# Patient Record
Sex: MALE | Race: WHITE | NOT HISPANIC OR LATINO | Employment: UNEMPLOYED | URBAN - METROPOLITAN AREA
[De-identification: names, ages, dates, MRNs, and addresses within clinical notes are randomized per-mention and may not be internally consistent; named-entity substitution may affect disease eponyms.]

---

## 2019-07-30 ENCOUNTER — TELEPHONE (OUTPATIENT)
Dept: PSYCHIATRY | Facility: CLINIC | Age: 24
End: 2019-07-30

## 2019-07-30 NOTE — TELEPHONE ENCOUNTER
----- Message from Shanique Wharton sent at 7/30/2019  1:31 PM CDT -----  Contact: Pt   Please give pt a call at [phone] (rzvr) to have a new pt appt scheduled.

## 2019-08-16 PROBLEM — F32.0 CURRENT MILD EPISODE OF MAJOR DEPRESSIVE DISORDER WITHOUT PRIOR EPISODE: Status: ACTIVE | Noted: 2019-08-16

## 2019-09-27 ENCOUNTER — INITIAL CONSULT (OUTPATIENT)
Dept: PSYCHIATRY | Facility: CLINIC | Age: 24
End: 2019-09-27
Payer: COMMERCIAL

## 2019-09-27 VITALS
SYSTOLIC BLOOD PRESSURE: 129 MMHG | WEIGHT: 260.13 LBS | HEART RATE: 69 BPM | BODY MASS INDEX: 31.67 KG/M2 | DIASTOLIC BLOOD PRESSURE: 86 MMHG

## 2019-09-27 DIAGNOSIS — F41.1 GENERALIZED ANXIETY DISORDER: ICD-10-CM

## 2019-09-27 DIAGNOSIS — F31.81 BIPOLAR II DISORDER, MILD, DEPRESSED, WITH ANXIOUS DISTRESS: Primary | ICD-10-CM

## 2019-09-27 PROCEDURE — 90792 PR PSYCHIATRIC DIAGNOSTIC EVALUATION W/MEDICAL SERVICES: ICD-10-PCS | Mod: S$GLB,,, | Performed by: PSYCHIATRY & NEUROLOGY

## 2019-09-27 PROCEDURE — 90792 PSYCH DIAG EVAL W/MED SRVCS: CPT | Mod: S$GLB,,, | Performed by: PSYCHIATRY & NEUROLOGY

## 2019-09-27 PROCEDURE — 99999 PR PBB SHADOW E&M-EST. PATIENT-LVL II: CPT | Mod: PBBFAC,,, | Performed by: PSYCHIATRY & NEUROLOGY

## 2019-09-27 PROCEDURE — 99999 PR PBB SHADOW E&M-EST. PATIENT-LVL II: ICD-10-PCS | Mod: PBBFAC,,, | Performed by: PSYCHIATRY & NEUROLOGY

## 2019-09-27 RX ORDER — LAMOTRIGINE 25(21)-50
KIT ORAL
Qty: 1 KIT | Refills: 0 | Status: SHIPPED | OUTPATIENT
Start: 2019-09-27 | End: 2019-10-09

## 2019-09-27 RX ORDER — LAMOTRIGINE 50(42)-100
KIT ORAL
Qty: 1 KIT | Refills: 0 | Status: SHIPPED | OUTPATIENT
Start: 2019-09-27 | End: 2019-10-09

## 2019-09-27 NOTE — PATIENT INSTRUCTIONS
Lamotrigine tablets  What is this medicine?  LAMOTRIGINE (la LOPEZ tri jeen) is used to control seizures in adults and children with epilepsy and Lennox-Gastaut syndrome. It is also used in adults to treat bipolar disorder.  How should I use this medicine?  Take this medicine by mouth with a glass of water. Follow the directions on the prescription label. Do not chew these tablets. If this medicine upsets your stomach, take it with food or milk. Take your doses at regular intervals. Do not take your medicine more often than directed.  A special MedGuide will be given to you by the pharmacist with each new prescription and refill. Be sure to read this information carefully each time.  Talk to your pediatrician regarding the use of this medicine in children. While this drug may be prescribed for children as young as 2 years for selected conditions, precautions do apply.  What side effects may I notice from receiving this medicine?  Side effects you should report to your doctor or health care professional as soon as possible:  · allergic reactions like skin rash, itching or hives, swelling of the face, lips, or tongue  · blurred or double vision  · difficulty walking or controlling muscle movements  · fever  · headache, stiff neck, and sensitivity to light  · painful sores in the mouth, eyes, or nose  · redness, blistering, peeling or loosening of the skin, including inside the mouth  · severe muscle pain  · swollen lymph glands  · uncontrollable eye movements  · unusual bruising or bleeding  · unusually weak or tired  · vomiting  · worsening of mood, thoughts or actions of suicide or dying  · yellowing of the eyes or skin  Side effects that usually do not require medical attention (report to your doctor or health care professional if they continue or are bothersome):  · diarrhea or constipation  · difficulty sleeping  · nausea  · tremors  What may interact with this medicine?  · carbamazepine  · female hormones,  including contraceptive or birth control pills  · methotrexate  · phenobarbital  · phenytoin  · primidone  · pyrimethamine  · rifampin  · trimethoprim  · valproic acid  What if I miss a dose?  If you miss a dose, take it as soon as you can. If it is almost time for your next dose, take only that dose. Do not take double or extra doses.  Where should I keep my medicine?  Keep out of reach of children.  Store at room temperature between 15 and 30 degrees C (59 and 86 degrees F). Throw away any unused medicine after the expiration date.  What should I tell my health care provider before I take this medicine?  They need to know if you have any of these conditions:  · a history of depression or bipolar disorder  · aseptic meningitis during prior use of lamotrigine  · folate deficiency  · kidney disease  · liver disease  · suicidal thoughts, plans, or attempt; a previous suicide attempt by you or a family member  · an unusual or allergic reaction to lamotrigine or other seizure medications, other medicines, foods, dyes, or preservatives  · pregnant or trying to get pregnant  · breast-feeding  What should I watch for while using this medicine?  Visit your doctor or health care professional for regular checks on your progress. If you take this medicine for seizures, wear a Medic Alert bracelet or necklace. Carry an identification card with information about your condition, medicines, and doctor or health care professional.  It is important to take this medicine exactly as directed. When first starting treatment, your dose will need to be adjusted slowly. It may take weeks or months before your dose is stable. You should contact your doctor or health care professional if your seizures get worse or if you have any new types of seizures. Do not stop taking this medicine unless instructed by your doctor or health care professional. Stopping your medicine suddenly can increase your seizures or their severity.  Contact your  doctor or health care professional right away if you develop a rash while taking this medicine. Rashes may be very severe and sometimes require treatment in the hospital. Deaths from rashes have occurred. Serious rashes occur more often in children than adults taking this medicine. It is more common for these serious rashes to occur during the first 2 months of treatment, but a rash can occur at any time.  You may get drowsy, dizzy, or have blurred vision. Do not drive, use machinery, or do anything that needs mental alertness until you know how this medicine affects you. To reduce dizzy or fainting spells, do not sit or stand up quickly, especially if you are an older patient. Alcohol can increase drowsiness and dizziness. Avoid alcoholic drinks.  If you are taking this medicine for bipolar disorder, it is important to report any changes in your mood to your doctor or health care professional. If your condition gets worse, you get mentally depressed, feel very hyperactive or manic, have difficulty sleeping, or have thoughts of hurting yourself or committing suicide, you need to get help from your health care professional right away. If you are a caregiver for someone taking this medicine for bipolar disorder, you should also report these behavioral changes right away. The use of this medicine may increase the chance of suicidal thoughts or actions. Pay special attention to how you are responding while on this medicine.  Your mouth may get dry. Chewing sugarless gum or sucking hard candy, and drinking plenty of water may help. Contact your doctor if the problem does not go away or is severe.  Women who become pregnant while using this medicine may enroll in the [place] Antiepileptic Drug Pregnancy Registry by calling 1-460.175.5635. This registry collects information about the safety of antiepileptic drug use during pregnancy.  NOTE:This sheet is a summary. It may not cover all possible information. If you  have questions about this medicine, talk to your doctor, pharmacist, or health care provider. Copyright© 2017 [employer]        Treating Bipolar Disorder    Bipolar disorder results in extreme mood swings that can greatly disrupt your life. These symptoms may cause you distress. But with treatment, you can lead a more normal life.  Medicines  Bipolar disorder is often treated with medicines that stabilize moods. They help you feel better by keeping your moods more even, and help prevent future mood swings. Sometimes you may also be prescribed medicines that treat depression. All medicines can have side effects. If youre troubled by side effects, tell your healthcare provider. Changing the dose or type of your medicine may help. But dont stop taking medicines until your healthcare provider tells you. If you do, your symptoms will likely come back.  Talk therapy (psychotherapy)  Talking to a therapist or counselor may be part of your treatment. Having bipolar disorder can make it hard to hold a job or go to school. It can create stress for both you and your loved ones. A therapist can teach you how to cope with bipolar disorder. This can help you lessen manic or depressive episodes, or even prevent them. Your therapist can help you work out problems and heal relationships. He or she can also provide support when you need it most.  Friends and family  Those closest to you may also need support. There are many groups for families of people with bipolar disorder. Learning more about this disorder can help your loved ones cope. It can also help them take an active role in your care.  Looking ahead  People with bipolar disorder have periods with no symptoms. But it is a chronic illness that requires lifetime care. Just as with heart conditions or diabetes, bipolar symptoms can return or treatments many need to be changed. Ongoing professional support is key to effective long-term management. Much research is being  done on bipolar disorder. This research may lead to improved treatments and hope for a better future.  Resources  · National [place] of Mental Health  744.789.7166  www.nimh.nih.gov  · National [place] on Mental Illness  258.876.1286  www.[place].org  · [facility] [place]  521.699.4220  www.[facility].org  · National Suicide Prevention Lifeline 593-090-AIID (530-247-4056) www.suicidepreventionlifeline.org   Date Last Reviewed: 5/1/2017  © 8499-0393 [employer]. [address] [place] [address]. All rights reserved. This information is not intended as a substitute for professional medical care. Always follow your healthcare professional's instructions.

## 2019-09-27 NOTE — PROGRESS NOTES
Outpatient Psychiatry Initial Visit (MD/NP)    9/27/2019    John Clements, a 24 y.o. male, presenting for initial evaluation visit. Met with patient     Reason for Encounter: self-referral     Chief Complaint: Medication management for Depression and PTSD        History of Present Illness:   25 yo male who comes here for medication management main symptom of anxiety.  in [facility] for 4 years and then moved here 2 months ago to join [employer]. Mom grew up here. Was moved to the [employer] [place] the year before and it he felt that he did not like the way that it ran.   Endorses depression as the most frequent of his moods.      Symptoms:  Anxiety  He has tension headaches, cloudy in thinking, dreading getting up to face the day, heart racing  Feels over whelmed  Pristiq to some degree has decreased the frequency     Depression:  MDI score of 33 Refer to flow sheet data from that day  He has detached from his feeling. He feels that he has become black and white. Decreased motivation, not wanting to get up.     Insomnia  Sleep: at least two nights a week where he is wired (anxious thoughts) and doesn't need sleep.     Drinking to compensate for racing thoughts: 5 oz of whiskey He has turned to drinking alcohol to calm down the anxiety and to help with depressed mood. Drinks at least twice a week. No symptoms of withdrawal.     Mood Disorder Questionnaire:  + For episodes of increased self confidence, irritability leading to arguments, less sleep needed, racing thoughts, easy distractibility, increased energy, increased activity, increased socialization including calling friends in the middle of the night, increased sexual interest, increased money spending, all happening at the same time, and creating moderate problems in his life           Depression Symptoms:    1)Depressed mood most of the day, nearly every day: yes  2) Markedly diminished interest or pleasure: yes  3) Significant  weight loss when not dieting or weight gain or decrease or increase in appetite nearly every day: varies  4) Insomnia or hypersomnia nearly every day: yes  5) Psychomotor agitation or retardation nearly: yes  6) Fatigue or loss of energy nearly every day: yes  7) Feelings of worthlessness or excessive or inappropriate guilt: yes  8) Diminished ability to think or concentrate, or indecisiveness, nearly every day: yes  9) Recurrent thoughts of death (not just fear of dying), recurrent suicidal ideation without a specific plan, or a suicide attempt or a specific plan for committing suicide: passive    Acute Stress Disorder/PTSD    Traumatic Event:    Seeing someone be killed or seriously injured  Having a loved one die through homicide or suicide    In the past month, have you..  Had nightmares about the event(s) or thought about the event(s) when you did not want to: two nights a month   Tried hard not to think about the event(s) or went out of your way to avoid situations that reminded you of the event(s)? Yes (can't drive [place] [address])  Been constantly on guard, watchful, or easily startled?  (not easily started)  Felt numb or detached from people, activities, or your surroundings?  (numbness)  Felt guilty or unable to stop blaming yourself or others for the events(s) or any problems the event(s) may have caused? (in an indirect way)                  Review Of Systems:     Pertinent items are noted in HPI.    Current Evaluation:         Constitutional  General:   Well groomed, age appropriate     Musculoskeletal  Gait & Station:   non ataxic     Psychiatric  Speech:   clear and coherent, regular rate and rhythm   Mood & Affect:  Mood: currently anxious Affect: anxious   Thought Process:  Linear, logical, goal directed   Thought Content:  No delusions, no hallucinations, no si no hi   Insight:  fair   Judgement: intact   Orientation:  Oriented to time, place, person, and situation   Memory: Intact for both  recent and remote as evidenced by 3/3 object recall   Language: Intact   Attention Span & Concentration:  Intact to conversation. Capable of doing days of the week backwards   Fund of Knowledge:  Adequate for age and education level       Relevant Elements of Neurological Exam: normal gait      Laboratory Data  No visits with results within 1 Month(s) from this visit.   Latest known visit with results is:   No results found for any previous visit.               Past History:       Medications  Outpatient Encounter Medications as of 9/27/2019   Medication Sig Dispense Refill    desvenlafaxine succinate (PRISTIQ) 50 MG Tb24 TK 1 T PO  QD 30 tablet 2    lamoTRIgine (LAMICTAL ODT STARTER, BLUE,) 25 mg (21) -50 mg (7) TRDD Follow the instructions on the packet 1 kit 0    lamoTRIgine (LAMICTAL ODT STARTER, GREEN,) 50 mg (42) -100 mg (14) TRDD To be started after finishing the blue starter pack 1 kit 0     No facility-administered encounter medications on file as of 9/27/2019.        Medication Compliance  Yes    Past Medical/Surgical History:   Past Medical History:   Diagnosis Date    Anxiety      No past surgical history on file.    Neurological History:  Seizures:  no  Head Trauma:  no      Past Psychiatric History:   Previous Psychiatric Hospitalizations: no   Previous SI/HI: no  Previous Suicide Attempts: no    Previous Medication Trials: lexapro, pristiq  Psychiatric Care (current & past): no  History of Psychotherapy: none      SOCIAL HISTORY:  Developmental/Childhood: Grew up with mom , dad and mom  at [age]. Not a bad childhood but mom worked shift work as a . Had a  a lot. Has a little brother from mom's second marriage. Says mom is a good parent but she is not very emotional (her work was always first). Dad was not around a lot. Not very much of a nurturing figure. Mom remarried and was in an abusive relationship. That ended in divorce. Did not get along with step brother,  distant relationship.   History of Physical/Sexual Abuse: no  Education: college (some online classes to finish)    Employment: yes   Financial: yes    Relationship Status/Sexual Orientation: not   Children: none  Access to Gun: yes, he is a     Substance Abuse History:   Substance of Choice: alcohol  Substances Used:  Prescription pills, only when prescribed   Use of Alcohol:  Frequently, uses it to treat anxiety at most 5 oz of alcohol  Tobacco:   no  History of Withdrawals:  denied  History of Detox:   denied  Rehab History:  denied      Family History of Psychiatric History:  Family History   Problem Relation Age of Onset    Bipolar disorder Mother        Allergies:  Review of patient's allergies indicates:  No Known Allergies        Assessment - Diagnosis - Goals:     Impression:       ICD-10-CM ICD-9-CM   1. Bipolar II disorder, mild, depressed, with anxious distress F31.81 296.89   2. Generalized anxiety disorder F41.1 300.02       Strengths and Liabilities: Strength: Patient accepts guidance/feedback, Strength: Patient is expressive/articulate., Strength: Patient is intelligent.    Treatment Goals:  Specify outcomes written in observable, behavioral terms:   -Sleeping at least 6-8 hours a night minimum 5 days a week  -Decreased drinking, less then one glass once a week      Treatment Plan/Recommendations:   Orders Placed This Encounter    lamoTRIgine (LAMICTAL ODT STARTER, BLUE,) 25 mg (21) -50 mg (7) TRDD    lamoTRIgine (LAMICTAL ODT STARTER, GREEN,) 50 mg (42) -100 mg (14) TRDD     Patient will start Lamictal starter packed discussed side-effect profile    -Continue Pristiq 50 mg daily.    -Provided mood chart for him to document mood for the next 6 weeks  -Provided information regarding bipolar disorder and lamictal  -Discussed drug and alcohol dependence, patient aware of risks       -Patient was educated on possible side-effects of medications  -Discussed 911 for suicidal or homicidal  ideation, worsening symptoms, or adverse reactions to medications  -Patient will contact clinic with any questions or concerns    Neuroleptic R/B/SE:  Patient was educated on the risks, benefits, and side effects of neuroleptics including weight gain, metabolic syndrome, neuroleptic malignant syndrome, and movement disorder (EPS).  Patient verbalized understanding.     SSRI  R/B/SE of SSRI reviewed with patient including gi distress, sexual dysfunction, and serotonin syndrome.  Black box warning of increased risk of worsening depression and SI reviewed.  Patient verbalized understanding.    Return to Clinic: 6 weeks    Counseling time: 10 min  Total time: 50 min    Eunice Prather MD  9/27/2019

## 2019-10-02 ENCOUNTER — PATIENT MESSAGE (OUTPATIENT)
Dept: PSYCHIATRY | Facility: CLINIC | Age: 24
End: 2019-10-02

## 2019-10-08 ENCOUNTER — PATIENT MESSAGE (OUTPATIENT)
Dept: PSYCHIATRY | Facility: CLINIC | Age: 24
End: 2019-10-08

## 2019-10-08 RX ORDER — QUETIAPINE FUMARATE 50 MG/1
50 TABLET, FILM COATED ORAL NIGHTLY
Qty: 30 TABLET | Refills: 11 | Status: SHIPPED | OUTPATIENT
Start: 2019-10-08 | End: 2019-10-30

## 2019-10-09 ENCOUNTER — OFFICE VISIT (OUTPATIENT)
Dept: PSYCHIATRY | Facility: CLINIC | Age: 24
End: 2019-10-09
Payer: COMMERCIAL

## 2019-10-09 DIAGNOSIS — F31.81 BIPOLAR II DISORDER: Primary | ICD-10-CM

## 2019-10-09 PROCEDURE — 99213 PR OFFICE/OUTPT VISIT, EST, LEVL III, 20-29 MIN: ICD-10-PCS | Mod: S$GLB,,, | Performed by: PSYCHIATRY & NEUROLOGY

## 2019-10-09 PROCEDURE — 99213 OFFICE O/P EST LOW 20 MIN: CPT | Mod: S$GLB,,, | Performed by: PSYCHIATRY & NEUROLOGY

## 2019-10-09 RX ORDER — LORAZEPAM 1 MG/1
1 TABLET ORAL EVERY 6 HOURS PRN
Qty: 60 TABLET | Refills: 1 | Status: SHIPPED | OUTPATIENT
Start: 2019-10-09 | End: 2020-01-23

## 2019-10-09 RX ORDER — LORAZEPAM 1 MG/1
1 TABLET ORAL EVERY 6 HOURS PRN
Qty: 60 TABLET | Refills: 1 | Status: SHIPPED | OUTPATIENT
Start: 2019-10-09 | End: 2019-10-09

## 2019-10-09 NOTE — PROGRESS NOTES
ESTABLISHED OUTPATIENT VISIT   E/M LEVEL 3: [id number]    ENCOUNTER DATE: 10/19/2019  SITE: [facility] [place].       HISTORY    CHIEF COMPLAINT   John Clements is a 24 y.o. male who presents for followup of side effects associated with recent medication use. Previous diagnosis of Bipolar II disorder     HPI   Currently states that his mood is more even. Denies having depressed mood or manic impulsive spending. However, he describes a worsening of his anxiety, and has had some increased frequency of his anxiety attacks. He describes increased restlessness and having had increased palpitations, however, he does not want to stop his medications because other then the anxiety he feels that his mood is more even and he thinks that as long as anxiety is controlled he feels overall better then he has been in recent days.  Previously had been started on pristiq by outside doctor, previously started on lamictal at last appointment, and seroquel.     ROS   General ROS: positive for  - sleep disturbance  negative for - fatigue  Psychological ROS: positive for - anxiety  negative for - depression, hostility or suicidal ideation    PFSH  Past Medical History reviewed: Yes  Family History reviewed: No  Social History reviewed: Yes    Allergies:   Review of patient's allergies indicates:  No Known Allergies     PSYCHOTROPIC MEDICATIONS   Current Outpatient Medications on File Prior to Visit   Medication Sig Dispense Refill    desvenlafaxine succinate (PRISTIQ) 50 MG Tb24 TK 1 T PO  QD 30 tablet 2    lamoTRIgine 25 mg (84) -100 mg (14) DsPk FPD. TO BE STARTED AFTER THE BLUE FINISHING THE BLUE STARTER PACK  0    QUEtiapine (SEROQUEL) 50 MG tablet Take 1 tablet (50 mg total) by mouth every evening. 30 tablet 11     No current facility-administered medications on file prior to visit.          EXAMINATION    [unfilled]  There were no vitals filed for this visit.    CONSTITUTIONAL  General Appearance: well  groomed    MUSCULOSKELETAL  No involuntary muscle movements  Normal gait    PSYCHIATRIC   Mental Status Exam:  Appearance: unremarkable, age appropriate  Behavior/Cooperation: appopriate normal, cooperative  Speech:  normal tone, normal rate, normal pitch, normal volume  Language: grossly intact with spontaneous speech  Mood: anxious, denies depression   Affect:  congruent with mood, restless anxious  Thought Process: linear, logical and goal oriented   Thought Content: normal, no suicidality, no homicidality, no delusions, nor paranoia  Sensorium:  Awake  Alert and Oriented: x3 person, place, situation  Memory:    Recent:  Intact; able to report recent events  Attention/concentration: appropriate for age/education.   Insight:Intact  Judgment:  Intact      MEDICAL DECISION MAKING    DIAGNOSES  Encounter Diagnosis   Name Primary?    Bipolar II disorder Yes         PROBLEM LIST AND MANAGEMENT PLANS    Orders Placed This Encounter    LORazepam (ATIVAN) 1 MG tablet           PRESCRIPTION DRUG MANAGEMENT  Compliance: yes  Side Effects: lamictal anxiety   Regimen Adjustments: start ativan       Neuroleptic R/B/SE:  Patient was educated on the risks, benefits, and side effects of neuroleptics including weight gain, metabolic syndrome, neuroleptic malignant syndrome, and movement disorder (EPS).  Patient verbalized understanding.     SSRI  R/B/SE of SSRI reviewed with patient including gi distress, sexual dysfunction, and serotonin syndrome.  Black box warning of increased risk of worsening depression and SI reviewed.  Patient verbalized understanding.    DIAGNOSTIC TESTING  no      -Patient was educated on possible side-effects of medications  -Discussed 911 for suicidal or homicidal ideation, worsening symptoms, or adverse reactions to medications  -Patient will contact clinic with any questions or concerns    Eunice Prather

## 2019-10-18 ENCOUNTER — PATIENT MESSAGE (OUTPATIENT)
Dept: PSYCHIATRY | Facility: CLINIC | Age: 24
End: 2019-10-18

## 2019-10-19 RX ORDER — LAMOTRIGINE 25(84)-100
KIT ORAL
Refills: 0 | COMMUNITY
Start: 2019-09-30 | End: 2019-10-30

## 2019-10-30 ENCOUNTER — OFFICE VISIT (OUTPATIENT)
Dept: PSYCHIATRY | Facility: CLINIC | Age: 24
End: 2019-10-30
Payer: COMMERCIAL

## 2019-10-30 VITALS
HEART RATE: 79 BPM | SYSTOLIC BLOOD PRESSURE: 142 MMHG | DIASTOLIC BLOOD PRESSURE: 86 MMHG | WEIGHT: 256.63 LBS | BODY MASS INDEX: 31.24 KG/M2

## 2019-10-30 DIAGNOSIS — F41.1 GAD (GENERALIZED ANXIETY DISORDER): ICD-10-CM

## 2019-10-30 DIAGNOSIS — T50.905A ACUTE MEDICATION-INDUCED AKATHISIA: ICD-10-CM

## 2019-10-30 DIAGNOSIS — F31.81 BIPOLAR II DISORDER: Primary | ICD-10-CM

## 2019-10-30 DIAGNOSIS — G25.71 ACUTE MEDICATION-INDUCED AKATHISIA: ICD-10-CM

## 2019-10-30 PROCEDURE — 3008F BODY MASS INDEX DOCD: CPT | Mod: CPTII,S$GLB,, | Performed by: PSYCHIATRY & NEUROLOGY

## 2019-10-30 PROCEDURE — 3008F PR BODY MASS INDEX (BMI) DOCUMENTED: ICD-10-PCS | Mod: CPTII,S$GLB,, | Performed by: PSYCHIATRY & NEUROLOGY

## 2019-10-30 PROCEDURE — 99999 PR PBB SHADOW E&M-EST. PATIENT-LVL III: ICD-10-PCS | Mod: PBBFAC,,, | Performed by: PSYCHIATRY & NEUROLOGY

## 2019-10-30 PROCEDURE — 99214 OFFICE O/P EST MOD 30 MIN: CPT | Mod: S$GLB,,, | Performed by: PSYCHIATRY & NEUROLOGY

## 2019-10-30 PROCEDURE — 99214 PR OFFICE/OUTPT VISIT, EST, LEVL IV, 30-39 MIN: ICD-10-PCS | Mod: S$GLB,,, | Performed by: PSYCHIATRY & NEUROLOGY

## 2019-10-30 PROCEDURE — 99999 PR PBB SHADOW E&M-EST. PATIENT-LVL III: CPT | Mod: PBBFAC,,, | Performed by: PSYCHIATRY & NEUROLOGY

## 2019-10-30 RX ORDER — TRAZODONE HYDROCHLORIDE 100 MG/1
TABLET ORAL
Qty: 30 TABLET | Refills: 3 | Status: SHIPPED | OUTPATIENT
Start: 2019-10-30 | End: 2020-01-23

## 2019-10-30 RX ORDER — DESVENLAFAXINE SUCCINATE 50 MG/1
TABLET, EXTENDED RELEASE ORAL
Qty: 30 TABLET | Refills: 2 | Status: SHIPPED | OUTPATIENT
Start: 2019-10-30 | End: 2019-11-20

## 2019-10-30 RX ORDER — ARIPIPRAZOLE 2 MG/1
2 TABLET ORAL DAILY
Qty: 30 TABLET | Refills: 11 | Status: SHIPPED | OUTPATIENT
Start: 2019-10-30 | End: 2019-11-20 | Stop reason: SDUPTHER

## 2019-10-30 NOTE — PROGRESS NOTES
Outpatient Psychiatry Follow-Up Visit (MD/NP)    10/30/2019    Clinical Status of Patient:  Outpatient (Ambulatory)    Chief Complaint:  John Clements is a 24 y.o. male who presents today for follow-up of mood disorder.  Met with patient.      Interval History and Content of Current Session:  Interim Events/Subjective Report/Content of Current Session:   Continues to report irritability and restlessness with Lamictal. He has had to take more of the Ativan then prescribed due to level of anxiety associated with medication. He currently still feels episodes of labile moods and depression. Currently denies any suicidal thinking, but does say that when the medication makes him feel more restless he does wish that he did not have to be here and have to deal with the increased anxiety. Right now, he is future oriented not thoughts of harming self or others.   He is doing well at work. Some problems with sleep and has had to rely on Seroquel at bedtime.         Review of Systems   · PSYCHIATRIC: Pertinant items are noted in the narrative.  · CONSTITUTIONAL: No weight gain or loss.     Past Medical, Family and Social History: The patient's past medical, family and social history have been reviewed and updated as appropriate within the electronic medical record - see encounter notes.    Compliance: yes    Side effects: irritability, ANXIETY    Risk Parameters:  Patient reports no suicidal ideation  Patient reports no homicidal ideation  Patient reports no self-injurious behavior  Patient reports no violent behavior    Exam (detailed: at least 9 elements; comprehensive: all 15 elements)   Constitutional  Vitals:  Most recent vital signs, dated less than 90 days prior to this appointment, were reviewed.   Vitals:    10/30/19 0839   BP: (!) 142/86   Pulse: 79   Weight: 116.4 kg (256 lb 9.9 oz)        General:  unremarkable, age appropriate     Musculoskeletal  Muscle Strength/Tone:  no dystonia, no tremor   Gait & Station:   non-ataxic     Psychiatric  Speech:  no latency; no press   Mood & Affect:  dysthymic  congruent and appropriate, blunted   Thought Process:  normal and logical   Associations:  intact   Thought Content:  normal, no suicidality, no homicidality, delusions, or paranoia   Insight:  has awareness of illness   Judgement: behavior is adequate to circumstances   Orientation:  grossly intact   Memory: intact for content of interview   Language: grossly intact   Attention Span & Concentration:  able to focus   Fund of Knowledge:  intact and appropriate to age and level of education     Assessment and Diagnosis   Status/Progress: Based on the examination today, the patient's problem(s) is/are inadequately controlled.  New problems have been presented today.   SIDE EFFECTS OF MEDICATIONS  are complicating management of the primary condition.  There are no active rule-out diagnoses for this patient at this time.     General Impression:       ICD-10-CM ICD-9-CM   1. Bipolar II disorder F31.81 296.89   2. RIVKA (generalized anxiety disorder) F41.1 300.02   3. Acute medication-induced akathisia G25.71 333.99    T50.905A E947.9       Intervention/Counseling/Treatment Plan   · Medication Management: The risks and benefits of medication were discussed with the patient. medication changes and cross titration     For two weeks decrease Lamictal to 25 mg daily  Continue Pristiq at current dose  Start Abilify 2 mg daily   Ativan 1 mg as needed for anxiety   Trazodone  mg at bedtime as needed for sleep   Return to Clinic: 2 weeks

## 2019-10-30 NOTE — PATIENT INSTRUCTIONS
For two weeks decrease Lamictal to 25 mg daily  Continue Pristiq at current dose  Start Abilify 2 mg daily   Ativan 1 mg as needed for anxiety   Trazodone  mg at bedtime as needed for sleep         Aripiprazole tablets  What is this medicine?  ARIPIPRAZOLE (ay ri PIP ray zole) is an atypical antipsychotic. It is used to treat schizophrenia and bipolar disorder, also known as manic-depression. It is also used to treat Tourette's disorder and some symptoms of autism. This medicine may also be used in combination with antidepressants to treat major depressive disorder.  How should I use this medicine?  Take this medicine by mouth with a glass of water. Follow the directions on the prescription label. You can take this medicine with or without food. Take your doses at regular intervals. Do not take your medicine more often than directed. Do not stop taking except on the advice of your doctor or health care professional.  A special MedGuide will be given to you by the pharmacist with each prescription and refill. Be sure to read this information carefully each time.  Talk to your pediatrician regarding the use of this medicine in children. While this drug may be prescribed for children as young as 6 years of age for selected conditions, precautions do apply.  What side effects may I notice from receiving this medicine?  Side effects that you should report to your doctor or health care professional as soon as possible:  · allergic reactions like skin rash, itching or hives, swelling of the face, lips, or tongue  · breathing problems  · confusion  · feeling faint or lightheaded, falls  · fever or chills, sore throat  · increased hunger or thirst  · increased urination  · joint pain  · muscles pain, spasms  · problems with balance, talking, walking  · restlessness or need to keep moving  · seizures  · suicidal thoughts or other mood changes  · trouble swallowing  · uncontrollable and excessive urges (examples:  gambling, binge eating, shopping, having sex)  · uncontrollable head, mouth, neck, arm, or leg movements  · unusually weak or tired  Side effects that usually do not require medical attention (report to your doctor or health care professional if they continue or are bothersome):  · blurred vision  · constipation  · headache  · nausea, vomiting  · trouble sleeping  · weight gain  What may interact with this medicine?  Do not take this medicine with any of the following medications:  · certain medicines for fungal infections like fluconazole, itraconazole, ketoconazole, posaconazole, voriconazole  · cisapride  · dofetilide  · dronedarone  · pimozide  · thioridazine  · ziprasidone  This medicine may also interact with the following medications:  · carbamazepine  · certain medicines for blood pressure  · erythromycin  · fluoxetine  · grapefruit juice  · other medicines that prolong the QT interval (cause an abnormal heart rhythm)  · paroxetine  · quinidine  · valproic acid  What if I miss a dose?  If you miss a dose, take it as soon as you can. If it is almost time for your next dose, take only that dose. Do not take double or extra doses.  Where should I keep my medicine?  Keep out of the reach of children.  Store at room temperature between 15 and 30 degrees C (59 and 86 degrees F). Throw away any unused medicine after the expiration date.  What should I tell my health care provider before I take this medicine?  They need to know if you have any of these conditions:  · dehydration  · dementia  · diabetes  · heart disease  · history of stroke  · low blood counts, like low white cell, platelet, or red cell counts  · Parkinson's disease  · seizures  · suicidal thoughts, plans, or attempt; a previous suicide attempt by you or a family member  · an unusual or allergic reaction to aripiprazole, other medicines, foods, dyes, or preservatives  · pregnant or trying to get pregnant  · breast-feeding  What should I watch for  while using this medicine?  Visit your doctor or health care professional for regular checks on your progress. It may be several weeks before you see the full effects of this medicine. Do not suddenly stop taking this medicine. You may need to gradually reduce the dose.  Patients and their families should watch out for worsening depression or thoughts of suicide. Also watch out for sudden changes in feelings such as feeling anxious, agitated, panicky, irritable, hostile, aggressive, impulsive, severely restless, overly excited and hyperactive, or not being able to sleep. If this happens, especially at the beginning of antidepressant treatment or after a change in dose, call your health care professional.  You may get dizzy or drowsy. Do not drive, use machinery, or do anything that needs mental alertness until you know how this medicine affects you. Do not stand or sit up quickly, especially if you are an older patient. This reduces the risk of dizzy or fainting spells. Alcohol can increase dizziness and drowsiness. Avoid alcoholic drinks.  This medicine can reduce the response of your body to heat or cold. Dress warm in cold weather and stay hydrated in hot weather. If possible, avoid extreme temperatures like saunas, hot tubs, very hot or cold showers, or activities that can cause dehydration such as vigorous exercise.  This medicine may cause dry eyes and blurred vision. If you wear contact lenses you may feel some discomfort. Lubricating drops may help. See your eye doctor if the problem does not go away or is severe.  If you notice an increased hunger or thirst, different from your normal hunger or thirst, or if you find that you have to urinate more frequently, you should contact your health care provider as soon as possible. You may need to have your blood sugar monitored. This medicine may cause changes in your blood sugar levels. You should monitor you blood sugar frequently if you have diabetes.  There  have been reports of uncontrollable and strong urges to riley, binge eat, shop, and have sex while taking this medicine. If you experience any of these or other uncontrollable and strong urges while taking this medicine, you should report it to your health care provider as soon as possible.  NOTE:This sheet is a summary. It may not cover all possible information. If you have questions about this medicine, talk to your doctor, pharmacist, or health care provider. Copyright© 2017 [employer]

## 2019-11-14 ENCOUNTER — PATIENT MESSAGE (OUTPATIENT)
Dept: PSYCHIATRY | Facility: CLINIC | Age: 24
End: 2019-11-14

## 2019-11-15 ENCOUNTER — TELEPHONE (OUTPATIENT)
Dept: PSYCHIATRY | Facility: CLINIC | Age: 24
End: 2019-11-15

## 2019-11-20 ENCOUNTER — PATIENT MESSAGE (OUTPATIENT)
Dept: PSYCHIATRY | Facility: CLINIC | Age: 24
End: 2019-11-20

## 2019-11-20 RX ORDER — DESVENLAFAXINE SUCCINATE 25 MG/1
50 TABLET, EXTENDED RELEASE ORAL DAILY
Qty: 30 TABLET | Refills: 3 | Status: SHIPPED | OUTPATIENT
Start: 2019-11-20 | End: 2020-01-23

## 2019-11-20 RX ORDER — ARIPIPRAZOLE 2 MG/1
2 TABLET ORAL DAILY
Qty: 30 TABLET | Refills: 11 | Status: SHIPPED | OUTPATIENT
Start: 2019-11-20 | End: 2019-11-20 | Stop reason: SDUPTHER

## 2019-11-20 RX ORDER — ARIPIPRAZOLE 2 MG/1
2 TABLET ORAL DAILY
Qty: 30 TABLET | Refills: 11 | Status: SHIPPED | OUTPATIENT
Start: 2019-11-20 | End: 2019-12-16

## 2019-11-22 ENCOUNTER — PATIENT MESSAGE (OUTPATIENT)
Dept: PSYCHIATRY | Facility: CLINIC | Age: 24
End: 2019-11-22

## 2019-11-22 ENCOUNTER — OFFICE VISIT (OUTPATIENT)
Dept: PSYCHIATRY | Facility: CLINIC | Age: 24
End: 2019-11-22
Payer: COMMERCIAL

## 2019-11-22 DIAGNOSIS — F41.1 GAD (GENERALIZED ANXIETY DISORDER): ICD-10-CM

## 2019-11-22 DIAGNOSIS — F31.81 BIPOLAR II DISORDER: Primary | ICD-10-CM

## 2019-11-22 PROCEDURE — 99499 UNLISTED E&M SERVICE: CPT | Mod: 95,,, | Performed by: PSYCHIATRY & NEUROLOGY

## 2019-11-22 PROCEDURE — 99499 NO LOS: ICD-10-PCS | Mod: 95,,, | Performed by: PSYCHIATRY & NEUROLOGY

## 2019-11-22 RX ORDER — MIRTAZAPINE 15 MG/1
15 TABLET, FILM COATED ORAL NIGHTLY
Qty: 30 TABLET | Refills: 11 | Status: SHIPPED | OUTPATIENT
Start: 2019-11-22 | End: 2020-01-23

## 2019-12-09 ENCOUNTER — PATIENT MESSAGE (OUTPATIENT)
Dept: PSYCHIATRY | Facility: CLINIC | Age: 24
End: 2019-12-09

## 2019-12-13 ENCOUNTER — PATIENT MESSAGE (OUTPATIENT)
Dept: PSYCHIATRY | Facility: CLINIC | Age: 24
End: 2019-12-13

## 2019-12-16 RX ORDER — ARIPIPRAZOLE 5 MG/1
5 TABLET ORAL DAILY
Qty: 30 TABLET | Refills: 11 | Status: SHIPPED | OUTPATIENT
Start: 2019-12-16 | End: 2020-01-09

## 2020-01-02 ENCOUNTER — TELEPHONE (OUTPATIENT)
Dept: PSYCHIATRY | Facility: CLINIC | Age: 25
End: 2020-01-02

## 2020-01-07 ENCOUNTER — PATIENT MESSAGE (OUTPATIENT)
Dept: PSYCHIATRY | Facility: CLINIC | Age: 25
End: 2020-01-07

## 2020-01-08 ENCOUNTER — PATIENT MESSAGE (OUTPATIENT)
Dept: PSYCHIATRY | Facility: CLINIC | Age: 25
End: 2020-01-08

## 2020-01-08 NOTE — TELEPHONE ENCOUNTER
If this is the case then let's have you stop it. And just continue your other medications and see how things proceed just with those medications as your maintenance medications. When is your next visit scheduled.

## 2020-01-09 ENCOUNTER — PATIENT MESSAGE (OUTPATIENT)
Dept: PSYCHIATRY | Facility: CLINIC | Age: 25
End: 2020-01-09

## 2020-01-09 RX ORDER — ARIPIPRAZOLE 2 MG/1
2 TABLET ORAL DAILY
Qty: 30 TABLET | Refills: 11 | Status: SHIPPED | OUTPATIENT
Start: 2020-01-09 | End: 2021-01-08

## 2020-01-21 ENCOUNTER — INITIAL CONSULT (OUTPATIENT)
Dept: PSYCHIATRY | Facility: CLINIC | Age: 25
End: 2020-01-21
Payer: COMMERCIAL

## 2020-01-21 DIAGNOSIS — F31.81 BIPOLAR II DISORDER: Primary | ICD-10-CM

## 2020-01-21 PROCEDURE — 90791 PSYCH DIAGNOSTIC EVALUATION: CPT | Mod: S$GLB,,, | Performed by: SOCIAL WORKER

## 2020-01-21 PROCEDURE — 90791 PR PSYCHIATRIC DIAGNOSTIC EVALUATION: ICD-10-PCS | Mod: S$GLB,,, | Performed by: SOCIAL WORKER

## 2020-01-21 NOTE — LETTER
January 21, 2020        Eunice Prather MD  [address]  [place] [address]             [facility]  [address] [facility] [address]  Phone: [phone]  Fax: [phone]   Patient: John Clements   MR Number: [id number]   YOB: 1995   Date of Visit: 1/21/2020       Dear Dr. Prather:    Thank you for referring John Clements to me for evaluation. Please see the initial evaluation for the relevant portions of my assessment and plan of care.    If you have questions, please do not hesitate to call me. I look forward to following John along with you.    Sincerely,      Gabriel Mcgowan, WILMAR           CC  No Recipients
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
## 2020-01-22 ENCOUNTER — TELEPHONE (OUTPATIENT)
Dept: PSYCHIATRY | Facility: CLINIC | Age: 25
End: 2020-01-22

## 2020-01-23 ENCOUNTER — OFFICE VISIT (OUTPATIENT)
Dept: PSYCHIATRY | Facility: CLINIC | Age: 25
End: 2020-01-23
Payer: COMMERCIAL

## 2020-01-23 DIAGNOSIS — F31.81 BIPOLAR 2 DISORDER: Primary | ICD-10-CM

## 2020-01-23 DIAGNOSIS — F41.1 GENERALIZED ANXIETY DISORDER: ICD-10-CM

## 2020-01-23 PROCEDURE — 99999 PR PBB SHADOW E&M-EST. PATIENT-LVL I: ICD-10-PCS | Mod: PBBFAC,,, | Performed by: PSYCHIATRY & NEUROLOGY

## 2020-01-23 PROCEDURE — 99999 PR PBB SHADOW E&M-EST. PATIENT-LVL I: CPT | Mod: PBBFAC,,, | Performed by: PSYCHIATRY & NEUROLOGY

## 2020-01-23 PROCEDURE — 99214 OFFICE O/P EST MOD 30 MIN: CPT | Mod: S$GLB,,, | Performed by: PSYCHIATRY & NEUROLOGY

## 2020-01-23 PROCEDURE — 99214 PR OFFICE/OUTPT VISIT, EST, LEVL IV, 30-39 MIN: ICD-10-PCS | Mod: S$GLB,,, | Performed by: PSYCHIATRY & NEUROLOGY

## 2020-01-23 RX ORDER — ESCITALOPRAM OXALATE 10 MG/1
10 TABLET ORAL DAILY
Qty: 30 TABLET | Refills: 11 | Status: SHIPPED | OUTPATIENT
Start: 2020-01-23 | End: 2020-03-02

## 2020-01-23 NOTE — PROGRESS NOTES
Outpatient Psychiatry Follow-Up Visit (MD/NP)    1/23/2020    Clinical Status of Patient:  Outpatient (Ambulatory)    Chief Complaint:  John Clements is a 24 y.o. male who presents today for follow-up of mood disorder.  Met with patient.      Interval History and Content of Current Session:  Interim Events/Subjective Report/Content of Current Session:      Sleep is about 8 or 9 hours  Appetite is elevated, feels like eating all the time (abilify)  Mood: no depressed mood, no feelings of worthlessness, no feelings of hopelessness, no suicidal thinking  Anxiety: hyperarousal, increased worrying, autonomic responses.   Psychosis: denies hallucinations or psychosis         Review of Systems   · PSYCHIATRIC: Pertinant items are noted in the narrative.  · CONSTITUTIONAL: No weight gain or loss.     Past Medical, Family and Social History: The patient's past medical, family and social history have been reviewed and updated as appropriate within the electronic medical record - see encounter notes.    Compliance: yes    Side effects: irritability, ANXIETY    Risk Parameters:  Patient reports no suicidal ideation  Patient reports no homicidal ideation  Patient reports no self-injurious behavior  Patient reports no violent behavior    Exam (detailed: at least 9 elements; comprehensive: all 15 elements)   Constitutional  Vitals:  Most recent vital signs, dated less than 90 days prior to this appointment, were reviewed.   There were no vitals filed for this visit.     General:  unremarkable, age appropriate     Musculoskeletal  Muscle Strength/Tone:  no dystonia, no tremor   Gait & Station:  non-ataxic     Psychiatric  Speech:  no latency; no press   Mood & Affect:  steady, anxious  congruent and appropriate, anxious   Thought Process:  normal and logical   Associations:  intact   Thought Content:  delusions: no, hallucinations: (auditory: no, visual: no, illusions: no), suicidal thoughts: (active-no, passive-no)   Insight:   has awareness of illness   Judgement: behavior is adequate to circumstances   Orientation:  grossly intact   Memory: intact for content of interview   Language: grossly intact   Attention Span & Concentration:  able to focus   Fund of Knowledge:  intact and appropriate to age and level of education     Assessment and Diagnosis   Status/Progress: Based on the examination today, the patient's problem(s) is/are adequately but not ideally controlled.  New problems have been presented today.   Co-morbidities are complicating management of the primary condition.  There are no active rule-out diagnoses for this patient at this time.     General Impression:       ICD-10-CM ICD-9-CM   1. Bipolar 2 disorder F31.81 296.89   2. Generalized anxiety disorder F41.1 300.02       Intervention/Counseling/Treatment Plan   · Medication Management: The risks and benefits of medication were discussed with the patient. medication changes and cross titration   Ativan 1 mg as needed for anxiety   Trazodone  mg at bedtime as needed for sleep   Continue Abilify at 2 mg   Orders Placed This Encounter    escitalopram oxalate (LEXAPRO) 10 MG tablet       Return to Clinic: 2 weeks

## 2020-01-23 NOTE — PATIENT INSTRUCTIONS
Escitalopram tablets  What is this medicine?  ESCITALOPRAM (es sye BRENDA oh pram) is used to treat depression and certain types of anxiety.  How should I use this medicine?  Take this medicine by mouth with a glass of water. Follow the directions on the prescription label. You can take it with or without food. If it upsets your stomach, take it with food. Take your medicine at regular intervals. Do not take it more often than directed. Do not stop taking this medicine suddenly except upon the advice of your doctor. Stopping this medicine too quickly may cause serious side effects or your condition may worsen.  A special MedGuide will be given to you by the pharmacist with each prescription and refill. Be sure to read this information carefully each time.  Talk to your pediatrician regarding the use of this medicine in children. Special care may be needed.  What side effects may I notice from receiving this medicine?  Side effects that you should report to your doctor or health care professional as soon as possible:  · allergic reactions like skin rash, itching or hives, swelling of the face, lips, or tongue  · confusion  · feeling faint or lightheaded, falls  · fast talking and excited feelings or actions that are out of control  · hallucination, loss of contact with reality  · seizures  · suicidal thoughts or other mood changes  · unusual bleeding or bruising  Side effects that usually do not require medical attention (report to your doctor or health care professional if they continue or are bothersome):  · blurred vision  · changes in appetite  · change in sex drive or performance  · headache  · increased sweating  · nausea  What may interact with this medicine?  Do not take this medicine with any of the following medications:  · certain medicines for fungal infections like fluconazole, itraconazole, ketoconazole, posaconazole,  voriconazole  · cisapride  · citalopram  · dofetilide  · dronedarone  · linezolid  · MAOIs like Carbex, Eldepryl, Marplan, Nardil, and Parnate  · methylene blue (injected into a vein)  · pimozide  · thioridazine  · ziprasidone  This medicine may also interact with the following medications:  · alcohol  · aspirin and aspirin-like medicines  · carbamazepine  · certain medicines for depression, anxiety, or psychotic disturbances  · certain medicines for migraine headache like almotriptan, eletriptan, frovatriptan, naratriptan, rizatriptan, sumatriptan, zolmitriptan  · certain medicines for sleep  · certain medicines that treat or prevent blood clots like warfarin, enoxaparin, dalteparin  · cimetidine  · diuretics  · fentanyl  · furazolidone  · isoniazid  · lithium  · metoprolol  · NSAIDs, medicines for pain and inflammation, like ibuprofen or naproxen  · other medicines that prolong the QT interval (cause an abnormal heart rhythm)  · procarbazine  · rasagiline  · supplements like Martha's wort, kava kava, valerian  · tramadol  · tryptophan  What if I miss a dose?  If you miss a dose, take it as soon as you can. If it is almost time for your next dose, take only that dose. Do not take double or extra doses.  Where should I keep my medicine?  Keep out of reach of children.  Store at room temperature between 15 and 30 degrees C (59 and 86 degrees F). Throw away any unused medicine after the expiration date.  What should I tell my health care provider before I take this medicine?  They need to know if you have any of these conditions:  · bipolar disorder or a family history of bipolar disorder  · diabetes  · glaucoma  · heart disease  · kidney or liver disease  · receiving electroconvulsive therapy  · seizures (convulsions)  · suicidal thoughts, plans, or attempt by you or a family member  · an unusual or allergic reaction to escitalopram, the related drug citalopram, other medicines, foods, dyes, or  preservatives  · pregnant or trying to become pregnant  · breast-feeding  What should I watch for while using this medicine?  Tell your doctor if your symptoms do not get better or if they get worse. Visit your doctor or health care professional for regular checks on your progress. Because it may take several weeks to see the full effects of this medicine, it is important to continue your treatment as prescribed by your doctor.  Patients and their families should watch out for new or worsening thoughts of suicide or depression. Also watch out for sudden changes in feelings such as feeling anxious, agitated, panicky, irritable, hostile, aggressive, impulsive, severely restless, overly excited and hyperactive, or not being able to sleep. If this happens, especially at the beginning of treatment or after a change in dose, call your health care professional.  You may get drowsy or dizzy. Do not drive, use machinery, or do anything that needs mental alertness until you know how this medicine affects you. Do not stand or sit up quickly, especially if you are an older patient. This reduces the risk of dizzy or fainting spells. Alcohol may interfere with the effect of this medicine. Avoid alcoholic drinks.  Your mouth may get dry. Chewing sugarless gum or sucking hard candy, and drinking plenty of water may help. Contact your doctor if the problem does not go away or is severe.  NOTE:This sheet is a summary. It may not cover all possible information. If you have questions about this medicine, talk to your doctor, pharmacist, or health care provider. Copyright© 2017 [employer]

## 2020-01-25 ENCOUNTER — PATIENT MESSAGE (OUTPATIENT)
Dept: PSYCHIATRY | Facility: CLINIC | Age: 25
End: 2020-01-25

## 2020-01-29 RX ORDER — LORAZEPAM 1 MG/1
1 TABLET ORAL EVERY 6 HOURS PRN
Qty: 60 TABLET | Refills: 1 | Status: SHIPPED | OUTPATIENT
Start: 2020-01-29 | End: 2020-09-24 | Stop reason: SDUPTHER

## 2020-02-29 ENCOUNTER — PATIENT MESSAGE (OUTPATIENT)
Dept: PSYCHIATRY | Facility: CLINIC | Age: 25
End: 2020-02-29

## 2020-03-02 RX ORDER — ESCITALOPRAM OXALATE 20 MG/1
20 TABLET ORAL DAILY
Qty: 30 TABLET | Refills: 11 | Status: SHIPPED | OUTPATIENT
Start: 2020-03-02 | End: 2020-04-20 | Stop reason: SDUPTHER

## 2020-03-12 ENCOUNTER — PATIENT MESSAGE (OUTPATIENT)
Dept: PSYCHIATRY | Facility: CLINIC | Age: 25
End: 2020-03-12

## 2020-04-20 RX ORDER — ESCITALOPRAM OXALATE 20 MG/1
20 TABLET ORAL DAILY
Qty: 30 TABLET | Refills: 11 | Status: SHIPPED | OUTPATIENT
Start: 2020-04-20 | End: 2021-04-20

## 2020-09-24 PROBLEM — K21.9 GASTROESOPHAGEAL REFLUX DISEASE: Status: ACTIVE | Noted: 2020-09-21

## 2020-09-24 PROBLEM — F41.9 ANXIETY: Status: ACTIVE | Noted: 2020-09-24

## 2020-09-24 PROBLEM — F31.81 BIPOLAR II DISORDER: Status: ACTIVE | Noted: 2020-09-24
